# Patient Record
Sex: MALE | Race: OTHER
[De-identification: names, ages, dates, MRNs, and addresses within clinical notes are randomized per-mention and may not be internally consistent; named-entity substitution may affect disease eponyms.]

---

## 2017-08-16 ENCOUNTER — HOSPITAL ENCOUNTER (EMERGENCY)
Dept: HOSPITAL 26 - MED | Age: 32
Discharge: LEFT BEFORE DISCHARGE | End: 2017-08-16
Payer: COMMERCIAL

## 2017-08-16 VITALS — HEIGHT: 69 IN | WEIGHT: 145 LBS | BODY MASS INDEX: 21.48 KG/M2

## 2017-08-16 VITALS — DIASTOLIC BLOOD PRESSURE: 73 MMHG | SYSTOLIC BLOOD PRESSURE: 122 MMHG

## 2017-08-16 DIAGNOSIS — Z53.21: ICD-10-CM

## 2017-08-16 DIAGNOSIS — R10.30: Primary | ICD-10-CM

## 2017-08-16 PROCEDURE — 76870 US EXAM SCROTUM: CPT

## 2017-08-16 PROCEDURE — 99281 EMR DPT VST MAYX REQ PHY/QHP: CPT

## 2018-07-17 ENCOUNTER — HOSPITAL ENCOUNTER (INPATIENT)
Dept: HOSPITAL 26 - MED | Age: 33
LOS: 1 days | Discharge: HOME | DRG: 465 | End: 2018-07-18
Attending: GENERAL PRACTICE | Admitting: GENERAL PRACTICE
Payer: COMMERCIAL

## 2018-07-17 VITALS — DIASTOLIC BLOOD PRESSURE: 71 MMHG | SYSTOLIC BLOOD PRESSURE: 110 MMHG

## 2018-07-17 VITALS — DIASTOLIC BLOOD PRESSURE: 81 MMHG | SYSTOLIC BLOOD PRESSURE: 115 MMHG

## 2018-07-17 VITALS — DIASTOLIC BLOOD PRESSURE: 95 MMHG | SYSTOLIC BLOOD PRESSURE: 128 MMHG

## 2018-07-17 VITALS — BODY MASS INDEX: 19.87 KG/M2 | WEIGHT: 134.13 LBS | HEIGHT: 69 IN

## 2018-07-17 DIAGNOSIS — F15.99: ICD-10-CM

## 2018-07-17 DIAGNOSIS — E83.42: ICD-10-CM

## 2018-07-17 DIAGNOSIS — N17.0: ICD-10-CM

## 2018-07-17 DIAGNOSIS — Z60.2: ICD-10-CM

## 2018-07-17 DIAGNOSIS — R31.9: ICD-10-CM

## 2018-07-17 DIAGNOSIS — E80.6: ICD-10-CM

## 2018-07-17 DIAGNOSIS — E83.39: ICD-10-CM

## 2018-07-17 DIAGNOSIS — E87.1: ICD-10-CM

## 2018-07-17 DIAGNOSIS — N13.2: Primary | ICD-10-CM

## 2018-07-17 DIAGNOSIS — F17.210: ICD-10-CM

## 2018-07-17 LAB
ALBUMIN FLD-MCNC: 4.1 G/DL (ref 3.4–5)
AMYLASE SERPL-CCNC: 47 U/L (ref 25–115)
ANION GAP SERPL CALCULATED.3IONS-SCNC: 11.7 MMOL/L (ref 8–16)
APAP SERPL-MCNC: < 0.5 UG/ML (ref 10–30)
AST SERPL-CCNC: 15 U/L (ref 15–37)
BASOPHILS # BLD AUTO: 0.1 K/UL (ref 0–0.22)
BASOPHILS NFR BLD AUTO: 0.9 % (ref 0–2)
BILIRUB SERPL-MCNC: 1.1 MG/DL (ref 0–1)
BUN SERPL-MCNC: 15 MG/DL (ref 7–18)
CHLORIDE SERPL-SCNC: 100 MMOL/L (ref 98–107)
CHOLEST/HDLC SERPL: 2.5 {RATIO} (ref 1–4.5)
CO2 SERPL-SCNC: 27.1 MMOL/L (ref 21–32)
CREAT SERPL-MCNC: 1.4 MG/DL (ref 0.7–1.3)
EOSINOPHIL # BLD AUTO: 0.3 K/UL (ref 0–0.4)
EOSINOPHIL NFR BLD AUTO: 3 % (ref 0–4)
ERYTHROCYTE [DISTWIDTH] IN BLOOD BY AUTOMATED COUNT: 13.4 % (ref 11.6–13.7)
GFR SERPL CREATININE-BSD FRML MDRD: 76 ML/MIN (ref 90–?)
GLUCOSE SERPL-MCNC: 102 MG/DL (ref 74–106)
HCT VFR BLD AUTO: 49.7 % (ref 36–52)
HDLC SERPL-MCNC: 60 MG/DL (ref 40–60)
HGB BLD-MCNC: 16.5 G/DL (ref 12–18)
LDLC SERPL CALC-MCNC: 76 MG/DL (ref 60–100)
LIPASE SERPL-CCNC: 102 U/L (ref 73–393)
LYMPHOCYTES # BLD AUTO: 1.8 K/UL (ref 2–11.5)
LYMPHOCYTES NFR BLD AUTO: 17.6 % (ref 20.5–51.1)
MAGNESIUM SERPL-MCNC: 1.8 MG/DL (ref 1.8–2.4)
MCH RBC QN AUTO: 31 PG (ref 27–31)
MCHC RBC AUTO-ENTMCNC: 33 G/DL (ref 33–37)
MCV RBC AUTO: 93.9 FL (ref 80–94)
MONOCYTES # BLD AUTO: 0.8 K/UL (ref 0.8–1)
MONOCYTES NFR BLD AUTO: 7.5 % (ref 1.7–9.3)
NEUTROPHILS # BLD AUTO: 7.3 K/UL (ref 1.8–7.7)
NEUTROPHILS NFR BLD AUTO: 71 % (ref 42.2–75.2)
PHOSPHATE SERPL-MCNC: 2.2 MG/DL (ref 2.5–4.9)
PLATELET # BLD AUTO: 311 K/UL (ref 140–450)
POTASSIUM SERPL-SCNC: 3.8 MMOL/L (ref 3.5–5.1)
PROTHROMBIN TIME: 11.2 SECS (ref 10.8–13.4)
RBC # BLD AUTO: 5.29 MIL/UL (ref 4.2–6.1)
SALICYLATES SERPL-MCNC: < 2.8 MG/DL (ref 2.8–20)
SODIUM SERPL-SCNC: 135 MMOL/L (ref 136–145)
TRIGL SERPL-MCNC: 64 MG/DL (ref 30–150)
TSH SERPL DL<=0.05 MIU/L-ACNC: 1.19 UIU/ML (ref 0.34–3.74)
WBC # BLD AUTO: 10.3 K/UL (ref 4.8–10.8)

## 2018-07-17 PROCEDURE — G0480 DRUG TEST DEF 1-7 CLASSES: HCPCS

## 2018-07-17 PROCEDURE — G0482 DRUG TEST DEF 15-21 CLASSES: HCPCS

## 2018-07-17 PROCEDURE — C1758 CATHETER, URETERAL: HCPCS

## 2018-07-17 RX ADMIN — SODIUM CHLORIDE SCH MLS/HR: 9 INJECTION, SOLUTION INTRAVENOUS at 15:30

## 2018-07-17 SDOH — SOCIAL STABILITY - SOCIAL INSECURITY: PROBLEMS RELATED TO LIVING ALONE: Z60.2

## 2018-07-17 NOTE — NUR
PT SLEEPING AT THIS TIME. NO S/S OF RESPIRATORY DISTRESS OR DISCOMFORT AT THIS TIME. WILL 
CONTINUE TO MONITOR.

## 2018-07-17 NOTE — NUR
31YO M TO ER FOR C/O LLQ ABD PAIN SHARP STABBING 10/10 SINCE 7AM THIS MORNING. 
PT THINKS IT WAS FROM THE CHICKEN HE ATE LAST NIGHT. PT TOOK PEPTOBISMOL THIS 
MORNING W/O RELIEF. HAD ONE EPISODE OF VOMITING THIS MORNING PTA. BS ACTIVE X4, 
ABD SOFT TENDER IN LLQ. LS CLEAR THROUGHOUT, 



HX: DENIES

## 2018-07-17 NOTE — NUR
VITAL SIGNS TAKEN AND TOLERATED WELL. NO S/S OF RESPIRATORY DISTRESS OR DISCOMFORT NOTED AT 
THIS TIME. WILL CONTINUE TO MONITOR.

## 2018-07-17 NOTE — NUR
Patient will be admitted to care of DR RITCHIE. Admited to TELE.  Will go to room 
119A

. Belongings list completed.  Report to AMARILIS PENNINGTON .

## 2018-07-17 NOTE — NUR
PATIENT ADMITTED TO THE UNIT FROM ER. PATIENT AWAKE, ALERT AND ORIENTED AND AMBULATORY. 
PATIENT ON ROOM AIR. NO SOB. NO S/S OF DISTRESS. PATIENT REPORTS TOLERABLE PAIN AT THIS 
TIME. PATIENT ON TELE MONITORING. BED LOWERED WITH CALL LIGHT WITHIN REACH. WILL CONTINUE TO 
MONITOR

## 2018-07-17 NOTE — NUR
RECEIVED REPORT FROM DAY SHIFT NURSE AMARILIS-RN AT BEDSIDE. PT AOX4- AWAKE AND ALERT WITH 
FAMILY AT BEDSIDE. PATIENT ON ROOM AIR. NO SOB. IV ON LEFT AC 20G WITH NS 0.9% RUNNING @ 
60ML/HR. DISCUSSED PLAN OF CARE AND PT VERBALIZED UNDERSTANDING. NO S/S OF RESPIRATORY 
DISTRESS OR DISCOMFORT AT THIS TIME. WHITE BOARD UPDATED. PATIENT ON TELE MONITORING. BED 
LOWERED WITH CALL LIGHT WITHIN REACH. WILL CONTINUE TO MONITOR

## 2018-07-18 VITALS — SYSTOLIC BLOOD PRESSURE: 117 MMHG | DIASTOLIC BLOOD PRESSURE: 91 MMHG

## 2018-07-18 VITALS — DIASTOLIC BLOOD PRESSURE: 65 MMHG | SYSTOLIC BLOOD PRESSURE: 96 MMHG

## 2018-07-18 VITALS — DIASTOLIC BLOOD PRESSURE: 65 MMHG | SYSTOLIC BLOOD PRESSURE: 107 MMHG

## 2018-07-18 VITALS — DIASTOLIC BLOOD PRESSURE: 72 MMHG | SYSTOLIC BLOOD PRESSURE: 112 MMHG

## 2018-07-18 VITALS — SYSTOLIC BLOOD PRESSURE: 116 MMHG | DIASTOLIC BLOOD PRESSURE: 82 MMHG

## 2018-07-18 LAB
ANION GAP SERPL CALCULATED.3IONS-SCNC: 11.7 MMOL/L (ref 8–16)
APPEARANCE UR: CLEAR
BARBITURATES UR QL SCN: (no result) NG/ML
BASOPHILS # BLD AUTO: 0 K/UL (ref 0–0.22)
BASOPHILS NFR BLD AUTO: 0.5 % (ref 0–2)
BENZODIAZ UR QL SCN: (no result) NG/ML
BILIRUB UR QL STRIP: (no result)
BUN SERPL-MCNC: 15 MG/DL (ref 7–18)
BZE UR QL SCN: (no result) NG/ML
CANNABINOIDS UR QL SCN: (no result) NG/ML
CHLORIDE SERPL-SCNC: 105 MMOL/L (ref 98–107)
CO2 SERPL-SCNC: 25.3 MMOL/L (ref 21–32)
COLOR UR: YELLOW
CREAT SERPL-MCNC: 1.2 MG/DL (ref 0.7–1.3)
EOSINOPHIL # BLD AUTO: 0.3 K/UL (ref 0–0.4)
EOSINOPHIL NFR BLD AUTO: 3.1 % (ref 0–4)
ERYTHROCYTE [DISTWIDTH] IN BLOOD BY AUTOMATED COUNT: 13 % (ref 11.6–13.7)
GFR SERPL CREATININE-BSD FRML MDRD: 90 ML/MIN (ref 90–?)
GLUCOSE SERPL-MCNC: 113 MG/DL (ref 74–106)
GLUCOSE UR STRIP-MCNC: NEGATIVE MG/DL
HCT VFR BLD AUTO: 43.4 % (ref 36–52)
HGB BLD-MCNC: 14.4 G/DL (ref 12–18)
HGB UR QL STRIP: (no result)
LEUKOCYTE ESTERASE UR QL STRIP: NEGATIVE
LYMPHOCYTES # BLD AUTO: 1.3 K/UL (ref 2–11.5)
LYMPHOCYTES NFR BLD AUTO: 15.8 % (ref 20.5–51.1)
MAGNESIUM SERPL-MCNC: 1.6 MG/DL (ref 1.8–2.4)
MCH RBC QN AUTO: 31 PG (ref 27–31)
MCHC RBC AUTO-ENTMCNC: 33 G/DL (ref 33–37)
MCV RBC AUTO: 93.6 FL (ref 80–94)
MONOCYTES # BLD AUTO: 0.5 K/UL (ref 0.8–1)
MONOCYTES NFR BLD AUTO: 6.5 % (ref 1.7–9.3)
NEUTROPHILS # BLD AUTO: 6.2 K/UL (ref 1.8–7.7)
NEUTROPHILS NFR BLD AUTO: 74.1 % (ref 42.2–75.2)
NITRITE UR QL STRIP: NEGATIVE
OPIATES UR QL SCN: (no result) NG/ML
PCP UR QL SCN: (no result) NG/ML
PH UR STRIP: 6 [PH] (ref 5–9)
PHOSPHATE SERPL-MCNC: 2.7 MG/DL (ref 2.5–4.9)
PLATELET # BLD AUTO: 248 K/UL (ref 140–450)
POTASSIUM SERPL-SCNC: 4 MMOL/L (ref 3.5–5.1)
RBC # BLD AUTO: 4.63 MIL/UL (ref 4.2–6.1)
RBC #/AREA URNS HPF: (no result) /HPF (ref 0–5)
SODIUM SERPL-SCNC: 138 MMOL/L (ref 136–145)
WBC # BLD AUTO: 8.3 K/UL (ref 4.8–10.8)
WBC,URINE: (no result) /HPF (ref 0–5)

## 2018-07-18 RX ADMIN — SODIUM CHLORIDE SCH MLS/HR: 9 INJECTION, SOLUTION INTRAVENOUS at 06:18

## 2018-07-18 RX ADMIN — TAMSULOSIN HYDROCHLORIDE SCH MG: 0.4 CAPSULE ORAL at 06:18

## 2018-07-18 RX ADMIN — TAMSULOSIN HYDROCHLORIDE SCH MG: 0.4 CAPSULE ORAL at 09:41

## 2018-07-18 RX ADMIN — SODIUM CHLORIDE SCH MLS/HR: 9 INJECTION, SOLUTION INTRAVENOUS at 05:21

## 2018-07-18 NOTE — NUR
NO BLADDER SCANNER AVAILABLE. RN SUPERVISOR SAID SHE LEFT MESSAGE TO BIOMED . PT NO 
DISCOMFORT NOTED.

## 2018-07-18 NOTE — NUR
ADMINISTERED MORPHINE AS ORDERED. PT SLEEPY AT THIS TIME. TOLERATED MEDS WELL. ASKED PT IF 
HE WANT TO WALK RT NOW. PT STATES WILL REST FRO A WHILE AND WILL WALK. INFORMED PT THAT WILL 
BE BACK TO CHECK ON HIM IN HOUR. INFORMED TO CALL ME IF HE WANTS TO WALK BEFORE THAT. PT 
WENT BACK TO SLEEP. CALL LIGHT WITHIN REACH. WILL CONTINUE TO MONITOR PT.

## 2018-07-18 NOTE — NUR
PT ALREADY ON REGULAR DIET. ENCOURAGED TO START TAKING MORE FLUIDS. PROVIDED SOME  JUICE. 
WILL CONTINUE TO MONITOR VOID.

## 2018-07-18 NOTE — NUR
CHECKED ON PT. PT SITTING ON BEDSIDE , EATING FOOD. PT WAS CRYING. STATES THAT HE IS WORRIED 
ABOUT HIS SITUATION. CONSOLED HIM THAT DOCTORS ARE TRYING THEIR BEST TO COME UP WITH BEST 
SOLUTION, WILL TAKE SOME TIME TO FUNCTION WELL. ASKED IF HE WANTS TO TALK TO DOCTOR, STATES 
THAT HE DON'T KNOW WHAT TO TALK ABOUT. INFORMED THAT HE CAN VERBALIZE HIS CONCERN, HE SAID 
ITS OK FOR NOW. INSTRUCTED PT TAKE WALK AS DOC HAS RECOMENDED TO WALK AROUND SO THAT IT CAN 
HELP HIM TO VOID EASILY . PT TO WALK . HELPED HIM WITH IV POLE. PT SAFE TO AMBULATE BY 
HIMSELF. WILL CONTINUE TO MONITOR THE PT.

## 2018-07-18 NOTE — NUR
VITAL SIGNS TAKEN AND TOLERATED WELL. NO S/S OF RESPIRATORY DISTRESS OR DISCOMFORT AT THIS 
TIME. WILL CONTINUE TO MONITOR.

## 2018-07-18 NOTE — NUR
PATIENT HAS BEEN SCREENED AND CATEGORIZED AS MODERATE NUTRITION RISK. PATIENT WILL BE SEEN 
WITHIN 3-5 DAYS OF ADMISSION.



7/20/18 7/22/18



JULIENNE GARZA RD

## 2018-07-18 NOTE — NUR
RECEIVED PT IN STASBLE CONDITION FROM GORGE HERNANDEZ FOR CONTINUITY OF CARE. ASLEEP AT THIS TIME. 
WITH IVF INFUSING WELL BED ON LOW POSITION, CALL LIGHT PLACED WITHIN EASY REACH. NO S/S OF 
ANY DISCOMFORT NOTED. WILL CONTINUE TO MONITOR.

## 2018-07-18 NOTE — NUR
PT WITH CHARGE NURSE AND STUDENT NURSE. DID STRAIGHT CATH ON PT. HAS OUTPUT 500 ML/ URINE 
SAMPLE COLLECTED AND SENT TO THE LAB FOR DRUG SCREEN AND THE DRUG SCREENING. CHECKED ON PT. 
TOLERATED FINE. STATES HAS LITTLE PAIN AND WANT OT REST. ADMINISTERED MEDS AS ORDER TO PT. 
TOLERATED WELL. THE RESIDUAL VOLUME AFTER THE STRAIGHT CATH WAS 19 ML. DOCTOR NOTIFIED. WILL 
CONTINUE TO MONITOR PT.

## 2018-07-18 NOTE — NUR
CHECKED ON PT. PT LYING ON BED. PT HAS URINATED IN THE URINAL. PT HAS VOIDED APPROXIMATELY 
300 ML. URINE IS LIGHT YELLOW. PT SEEMS OKAY. STATES HAS NO PAIN. HAD TO STAND UP FOR USING 
URINAL. NO DISTRESS NOTED. IV INFUSING WELL IN PT . CALL LIGHT WITHIN REACH. INFORMED TO USE 
THE CALL LIGHT TO ASK FOR ANY HELP. VERBALIZED UNDERSTANDING. WILL CONTINUE TO MONITOR PT.

## 2018-07-18 NOTE — NUR
Note:



Per patient, person to notify on face sheet is his neighbor Geena. He stated he does not 
know Geena's last name. He reported he does not want to disclose any of his family member's 
contact information. Patient does not use any dmes at home. Prior to hospital admission 
patient was independent with adls at home.

## 2018-07-18 NOTE — NUR
CHECKED ON PT. VITAL SIGNS STABLE . C/O HE HASN'T VOIDED SINCE YESTERDAY AM.  PALPATED THE 
BLADDER. NO DISTENTION NOTED.  WILL CONTINUE TO MONITOR.

## 2019-01-02 ENCOUNTER — HOSPITAL ENCOUNTER (EMERGENCY)
Dept: HOSPITAL 26 - MED | Age: 34
Discharge: HOME | End: 2019-01-02
Payer: MEDICAID

## 2019-01-02 VITALS — SYSTOLIC BLOOD PRESSURE: 118 MMHG | DIASTOLIC BLOOD PRESSURE: 85 MMHG

## 2019-01-02 VITALS — HEIGHT: 69 IN | BODY MASS INDEX: 19.99 KG/M2 | WEIGHT: 135 LBS

## 2019-01-02 VITALS — SYSTOLIC BLOOD PRESSURE: 153 MMHG | DIASTOLIC BLOOD PRESSURE: 78 MMHG
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
## 2019-01-02 DIAGNOSIS — Z79.899: ICD-10-CM
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
## 2019-01-02 DIAGNOSIS — S61.213A: Primary | ICD-10-CM

## 2019-01-02 DIAGNOSIS — Z87.442: ICD-10-CM
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
## 2019-06-26 ENCOUNTER — HOSPITAL ENCOUNTER (EMERGENCY)
Dept: HOSPITAL 26 - MED | Age: 34
Discharge: HOME | End: 2019-06-26
Payer: COMMERCIAL

## 2019-06-26 VITALS — DIASTOLIC BLOOD PRESSURE: 90 MMHG | SYSTOLIC BLOOD PRESSURE: 124 MMHG

## 2019-06-26 VITALS — WEIGHT: 143 LBS | HEIGHT: 69 IN | BODY MASS INDEX: 21.18 KG/M2

## 2019-06-26 VITALS — DIASTOLIC BLOOD PRESSURE: 83 MMHG | SYSTOLIC BLOOD PRESSURE: 128 MMHG

## 2019-06-26 DIAGNOSIS — Z79.899: ICD-10-CM
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
## 2019-06-26 DIAGNOSIS — Z79.891: ICD-10-CM

## 2019-06-26 DIAGNOSIS — S05.02XA: Primary | ICD-10-CM
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
## 2019-06-26 NOTE — NUR
PT BIB SELF TO THE ED WITH THE CHIEF C/O LEFT EYE PAIN SINCE YESTERDAY. PT WAS 
DOING GARDENING YESTERDAY AND SOMETHING GOT INTO HIS LEFT EYE. PT'S LEFT EYE 
HURTS SINCE THEN. PT PUT BACK VINZE IN HIS EYE YESTERDAY. UNABLE TO OPEN EYE 
SELF AT THIS TIME. HAS GOOD VISION ON RIGHT EYE. DENIES ANY HX OF EYE SURGERY. 
DENIES MEDICAL HX. STATES PAIN OF 8/10 AT THIS TIME.

## 2019-06-26 NOTE — NUR
Patient discharged with v/s stable. Written and verbal after care instructions 
given and explained. 

Patient alert, oriented and verbalized understanding of instructions. 
Ambulatory with steady gait. All questions addressed prior to discharge. ID 
band removed. Patient advised to follow up with PMD. Rx of erythromycin, norco 
given. Patient educated on indication of medication including possible reaction 
and side effects. Opportunity to ask questions provided and answered.
